# Patient Record
Sex: FEMALE | Race: WHITE
[De-identification: names, ages, dates, MRNs, and addresses within clinical notes are randomized per-mention and may not be internally consistent; named-entity substitution may affect disease eponyms.]

---

## 2017-02-04 ENCOUNTER — HOSPITAL ENCOUNTER (INPATIENT)
Dept: HOSPITAL 10 - OBT | Age: 22
LOS: 3 days | Discharge: HOME | End: 2017-02-07
Attending: OBSTETRICS & GYNECOLOGY | Admitting: OBSTETRICS & GYNECOLOGY
Payer: COMMERCIAL

## 2017-02-04 VITALS
BODY MASS INDEX: 40.62 KG/M2 | HEIGHT: 63 IN | HEIGHT: 63 IN | BODY MASS INDEX: 40.62 KG/M2 | WEIGHT: 229.28 LBS | WEIGHT: 229.28 LBS

## 2017-02-04 VITALS — SYSTOLIC BLOOD PRESSURE: 151 MMHG | HEART RATE: 86 BPM | RESPIRATION RATE: 18 BRPM | DIASTOLIC BLOOD PRESSURE: 81 MMHG

## 2017-02-04 DIAGNOSIS — Z3A.40: ICD-10-CM

## 2017-02-04 DIAGNOSIS — O48.0: Primary | ICD-10-CM

## 2017-02-04 LAB
ADD UMIC: YES
ALBUMIN SERPL-MCNC: 3.3 G/DL (ref 3.3–4.9)
ALBUMIN/GLOB SERPL: 0.97 {RATIO}
ALP SERPL-CCNC: 205 IU/L (ref 42–121)
ALT SERPL-CCNC: 21 IU/L (ref 13–69)
ANION GAP SERPL CALC-SCNC: 14 MMOL/L (ref 8–16)
APTT BLD: 27.5 SEC (ref 25–35)
AST SERPL-CCNC: 15 IU/L (ref 15–46)
BACTERIA #/AREA URNS HPF: (no result) /[HPF]
BASOPHILS # BLD AUTO: 0 10^3/UL (ref 0–0.1)
BASOPHILS NFR BLD: 0.4 % (ref 0–2)
BILIRUB DIRECT SERPL-MCNC: 0 MG/DL (ref 0–0.2)
BILIRUB SERPL-MCNC: 0.3 MG/DL (ref 0.2–1.3)
BUN SERPL-MCNC: 6 MG/DL (ref 7–20)
CALCIUM SERPL-MCNC: 9.1 MG/DL (ref 8.4–10.2)
CHLORIDE SERPL-SCNC: 105 MMOL/L (ref 97–110)
CO2 SERPL-SCNC: 24 MMOL/L (ref 21–31)
COLOR UR: (no result)
CONDITION: 1
CREAT SERPL-MCNC: 0.46 MG/DL (ref 0.44–1)
EOSINOPHIL # BLD: 0 10^3/UL (ref 0–0.5)
EOSINOPHIL NFR BLD: 0.3 % (ref 0–7)
ERYTHROCYTE [DISTWIDTH] IN BLOOD BY AUTOMATED COUNT: 13.5 % (ref 11.5–14.5)
FIBRINOGEN PPP-MCNC: 449 MG/DL (ref 207–461)
GLOBULIN SER-MCNC: 3.4 G/DL (ref 1.3–3.2)
GLUCOSE SERPL-MCNC: 69 MG/DL (ref 70–220)
GLUCOSE UR STRIP-MCNC: NEGATIVE %
HCT VFR BLD CALC: 34.5 % (ref 37–47)
HGB BLD-MCNC: 11.5 G/DL (ref 12–16)
ICTOTEST: NEGATIVE
INR PPP: 0.88
KETONES UR STRIP.AUTO-MCNC: (no result) MG/DL
LYMPHOCYTES # BLD AUTO: 2.1 10^3/UL (ref 0.8–2.9)
LYMPHOCYTES NFR BLD AUTO: 23.5 % (ref 15–51)
MCH RBC QN AUTO: 29.4 PG (ref 29–33)
MCHC RBC AUTO-ENTMCNC: 33.3 G/DL (ref 32–37)
MCV RBC AUTO: 88.4 FL (ref 82–101)
MONOCYTES # BLD: 0.7 10^3/UL (ref 0.3–0.9)
MONOCYTES NFR BLD: 7.3 % (ref 0–11)
NEUTROPHILS # BLD: 6.3 10^3/UL (ref 1.6–7.5)
NEUTROPHILS NFR BLD AUTO: 68.5 % (ref 39–77)
NITRITE UR QL STRIP.AUTO: NEGATIVE
NRBC # BLD MANUAL: 0 10^3/UL (ref 0–0)
NRBC BLD QL: 0 /100WBC (ref 0–0)
PLATELET # BLD: 214 10^3/UL (ref 140–440)
PMV BLD AUTO: 11.1 FL (ref 7.4–10.4)
POTASSIUM SERPL-SCNC: 4.2 MMOL/L (ref 3.5–5.1)
PROT SERPL-MCNC: 6.7 G/DL (ref 6.1–8.1)
PROTHROMBIN TIME: 11.9 SEC (ref 12.2–14.2)
PT RATIO: 0.9
RBC # BLD AUTO: 3.91 10^6/UL (ref 4.2–5.4)
RBC # UR AUTO: (no result) /UL
RBC #/AREA URNS HPF: (no result) /HPF
SODIUM SERPL-SCNC: 139 MMOL/L (ref 135–144)
SQUAMOUS #/AREA URNS HPF: (no result) /[HPF]
URATE SERPL-MCNC: 4.5 MG/DL (ref 3.1–7.9)
URINE BILIRUBIN (DIP): (no result)
URINE TOTAL PROTEIN (DIP): (no result)
UROBILINOGEN UR STRIP-ACNC: (no result) (ref 0.1–1)
WBC # BLD AUTO: 9.1 10^3/UL (ref 4.8–10.8)
WBC # BLD: 9.1 10^3/UL (ref 4.8–10.8)
WBC # UR STRIP: (no result) /UL

## 2017-02-04 PROCEDURE — 87340 HEPATITIS B SURFACE AG IA: CPT

## 2017-02-04 PROCEDURE — 85025 COMPLETE CBC W/AUTO DIFF WBC: CPT

## 2017-02-04 PROCEDURE — 85384 FIBRINOGEN ACTIVITY: CPT

## 2017-02-04 PROCEDURE — 86703 HIV-1/HIV-2 1 RESULT ANTBDY: CPT

## 2017-02-04 PROCEDURE — 85730 THROMBOPLASTIN TIME PARTIAL: CPT

## 2017-02-04 PROCEDURE — 76818 FETAL BIOPHYS PROFILE W/NST: CPT

## 2017-02-04 PROCEDURE — 85610 PROTHROMBIN TIME: CPT

## 2017-02-04 PROCEDURE — 36415 COLL VENOUS BLD VENIPUNCTURE: CPT

## 2017-02-04 PROCEDURE — 80053 COMPREHEN METABOLIC PANEL: CPT

## 2017-02-04 PROCEDURE — 86592 SYPHILIS TEST NON-TREP QUAL: CPT

## 2017-02-04 PROCEDURE — 80307 DRUG TEST PRSMV CHEM ANLYZR: CPT

## 2017-02-04 PROCEDURE — 86762 RUBELLA ANTIBODY: CPT

## 2017-02-04 PROCEDURE — 81001 URINALYSIS AUTO W/SCOPE: CPT

## 2017-02-04 PROCEDURE — G0463 HOSPITAL OUTPT CLINIC VISIT: HCPCS

## 2017-02-04 PROCEDURE — 86900 BLOOD TYPING SEROLOGIC ABO: CPT

## 2017-02-04 PROCEDURE — 90715 TDAP VACCINE 7 YRS/> IM: CPT

## 2017-02-04 PROCEDURE — 76815 OB US LIMITED FETUS(S): CPT

## 2017-02-04 PROCEDURE — 62319: CPT

## 2017-02-04 PROCEDURE — 86901 BLOOD TYPING SEROLOGIC RH(D): CPT

## 2017-02-04 PROCEDURE — 81003 URINALYSIS AUTO W/O SCOPE: CPT

## 2017-02-04 PROCEDURE — 84560 ASSAY OF URINE/URIC ACID: CPT

## 2017-02-04 RX ADMIN — PYRIDOXINE HYDROCHLORIDE SCH MLS/HR: 100 INJECTION, SOLUTION INTRAMUSCULAR; INTRAVENOUS at 21:17

## 2017-02-04 NOTE — RADRPT
PROCEDURE:   US OB 

 

CLINICAL INDICATION:   POST DATES   VAG BLEEDING

 

TECHNIQUE:   Multiple sonographic images of the pelvis were obtained.  The images were reviewed on a
 PACS workstation. 

 

COMPARISON:   None

 

FINDINGS:

The cervix is not well visualized.

There is a single viable intrauterine gestation.  

Cardiac activity is present with 148 beats per minute. 

There is a vertex presentation. 

 

The placenta is fundal. There is no evidence for an abruption or placenta previa.

There is a subjectively normal amount of amniotic fluid.

 

Measurements were made in order to determine fetal age. The results are as follows (cm):

BPD =8.85

HC   =31.43

AC   =34.25

FL    =7.45

Estimated gestational age by ultrasound of approximately 36 weeks, 6 days.  

The estimated date of delivery by ultrasound is 02/26/2017.  

Reported gestational age by LMP of approximately 40 weeks, 5 days.

The reported date of delivery by LMP was 01/30/2017.

 

EFW = 3230  grams (14th percentile)

 

 

IMPRESSION:

Single viable intrauterine gestation of approximately 36 weeks, 6 days .  

The estimated date of delivery is 02/26/2017 .

Dating by ultrasound is within 3 weeks and 5 days of dating by LMP.

 

Estimated fetal weight in the 14th percentile.

 

Cephalic presentation.

 

 

RPTAT: EE

_____________________________________________ 

Physician Yolande           Date    Time 

Electronically viewed and signed by Physician Yolande on 02/04/2017 19:27 

 

D:  02/04/2017 19:27  T:  02/04/2017 19:27

RICHA/

## 2017-02-05 VITALS — DIASTOLIC BLOOD PRESSURE: 75 MMHG | SYSTOLIC BLOOD PRESSURE: 146 MMHG | RESPIRATION RATE: 18 BRPM | HEART RATE: 86 BPM

## 2017-02-05 VITALS — HEART RATE: 79 BPM | SYSTOLIC BLOOD PRESSURE: 129 MMHG | RESPIRATION RATE: 18 BRPM | DIASTOLIC BLOOD PRESSURE: 62 MMHG

## 2017-02-05 VITALS — HEART RATE: 94 BPM | DIASTOLIC BLOOD PRESSURE: 68 MMHG | SYSTOLIC BLOOD PRESSURE: 133 MMHG | RESPIRATION RATE: 19 BRPM

## 2017-02-05 VITALS — SYSTOLIC BLOOD PRESSURE: 124 MMHG | RESPIRATION RATE: 18 BRPM | DIASTOLIC BLOOD PRESSURE: 67 MMHG | HEART RATE: 82 BPM

## 2017-02-05 VITALS — HEART RATE: 82 BPM | DIASTOLIC BLOOD PRESSURE: 64 MMHG | SYSTOLIC BLOOD PRESSURE: 131 MMHG | RESPIRATION RATE: 18 BRPM

## 2017-02-05 VITALS — DIASTOLIC BLOOD PRESSURE: 61 MMHG | RESPIRATION RATE: 20 BRPM | SYSTOLIC BLOOD PRESSURE: 126 MMHG

## 2017-02-05 VITALS — SYSTOLIC BLOOD PRESSURE: 131 MMHG | HEART RATE: 95 BPM | RESPIRATION RATE: 18 BRPM | DIASTOLIC BLOOD PRESSURE: 74 MMHG

## 2017-02-05 PROCEDURE — 0KQM0ZZ REPAIR PERINEUM MUSCLE, OPEN APPROACH: ICD-10-PCS | Performed by: OBSTETRICS & GYNECOLOGY

## 2017-02-05 RX ADMIN — IBUPROFEN SCH MG: 600 TABLET ORAL at 23:47

## 2017-02-05 RX ADMIN — AMPICILLIN SODIUM SCH MLS/HR: 1 INJECTION, POWDER, FOR SOLUTION INTRAMUSCULAR; INTRAVENOUS at 16:58

## 2017-02-05 RX ADMIN — PYRIDOXINE HYDROCHLORIDE PRN MLS/HR: 100 INJECTION, SOLUTION INTRAMUSCULAR; INTRAVENOUS at 18:22

## 2017-02-05 RX ADMIN — AMPICILLIN SODIUM SCH MLS/HR: 1 INJECTION, POWDER, FOR SOLUTION INTRAMUSCULAR; INTRAVENOUS at 05:25

## 2017-02-05 RX ADMIN — AMPICILLIN SODIUM SCH MLS/HR: 1 INJECTION, POWDER, FOR SOLUTION INTRAMUSCULAR; INTRAVENOUS at 09:03

## 2017-02-05 RX ADMIN — AMPICILLIN SODIUM SCH MLS/HR: 1 INJECTION, POWDER, FOR SOLUTION INTRAMUSCULAR; INTRAVENOUS at 13:34

## 2017-02-05 RX ADMIN — PYRIDOXINE HYDROCHLORIDE PRN MLS/HR: 100 INJECTION, SOLUTION INTRAMUSCULAR; INTRAVENOUS at 09:05

## 2017-02-05 RX ADMIN — PYRIDOXINE HYDROCHLORIDE PRN MLS/HR: 100 INJECTION, SOLUTION INTRAMUSCULAR; INTRAVENOUS at 10:49

## 2017-02-05 RX ADMIN — PYRIDOXINE HYDROCHLORIDE SCH MLS/HR: 100 INJECTION, SOLUTION INTRAMUSCULAR; INTRAVENOUS at 05:25

## 2017-02-05 RX ADMIN — AMPICILLIN SODIUM SCH MLS/HR: 1 INJECTION, POWDER, FOR SOLUTION INTRAMUSCULAR; INTRAVENOUS at 01:05

## 2017-02-05 NOTE — HP
Date/Time of Note


Date/Time of Note


DATE: 17 


TIME: 10:46





OB - History


Hx of Present Pregnancy


Free Text/Dictation


@40+wks GA in labor High Blood Pressure 150/80


:  1


Para:  0


Prenatal Care:  Good Care


Ultrasounds:  Normal mid trimester US


Obstetrical Complications:  None


Medical Complications:  None





Past Family/Social History


*


Past Medical, Surgical, Family and Obstetric Histories reviewed from prenatal 

chart.





OB  Admission Exam


Vital Signs


Vital Signs





 Vital Signs








  Date Time  Temp Pulse Resp B/P Pulse Ox O2 Delivery O2 Flow Rate FiO2


 


17 18:50 97.9 86 18 151/81  Room Air  











Physical Exam


Abdomen:  WNL


Cervical Dilatation:  2cm


Effacement:  50%


Station:  -1


Fetal Heart Rate:  140's


Accelerations:  Accelerations Present


Decelerations:  No Decelerations


Varibility:  Moderate


Contractions on Admission:  6-10 Minutes Apart


Last 72 hours Lab Results


 CBC & BMP


17 19:10











 Liver Function








Test


  17


19:10


 


Alanine Aminotransferase


(ALT/SGPT) 21  


 


 


Albumin 3.3  


 


Alkaline Phosphatase 205  H


 


Aspartate Amino Transf


(AST/SGOT) 15  


 


 


Direct Bilirubin 0.00  


 


Total Protein 6.7  











OB  Assessment/Plan


Reason for admission:  induction of labor


Plan:  Induction











PURVI HERNANDEZ M.D. 2017 10:47

## 2017-02-05 NOTE — LDN
Date/Time of Note


Date/Time of Note


DATE: 2/5/17 


TIME: 19:21





Delivery Summary


Placenta Delivered:  Spontaneously


Meconium:  Light


Perineum intact?:  No


Perineal laceration:  2


Perineal laceration repair:  


yes


Anesthesia type:  Epidural


Sponge & Needle done & correct:  Yes


All needle counts correct:  Yes


Any foreign bodies felt in the:  No


Problems:  





Infant Delivery Information


Sex


Infant Sex:  female





Apgars


1 Minute:  7


5 Minute:  9





Suctioning


Nose & mouth suctioned at ju:  Yes


Delee suction performed:  Yes





Umbilical Cord


Umbilical cord with:  3 Vessels


Cord presentations:  nuchal cord


Nuchal cord present X:  1


Cord Blood was obtained:  Yes





Mother & Baby Disposition


Disposition


Mom & Baby to Maternity; Good:  Yes


Baby to NICU:  No











PURVI HERNANDEZ M.D. Feb 5, 2017 19:23

## 2017-02-06 VITALS — RESPIRATION RATE: 18 BRPM | HEART RATE: 80 BPM | DIASTOLIC BLOOD PRESSURE: 67 MMHG | SYSTOLIC BLOOD PRESSURE: 110 MMHG

## 2017-02-06 VITALS — HEART RATE: 86 BPM | RESPIRATION RATE: 18 BRPM | SYSTOLIC BLOOD PRESSURE: 119 MMHG | DIASTOLIC BLOOD PRESSURE: 66 MMHG

## 2017-02-06 VITALS — HEART RATE: 89 BPM | RESPIRATION RATE: 18 BRPM | DIASTOLIC BLOOD PRESSURE: 71 MMHG | SYSTOLIC BLOOD PRESSURE: 117 MMHG

## 2017-02-06 VITALS — DIASTOLIC BLOOD PRESSURE: 69 MMHG | RESPIRATION RATE: 18 BRPM | SYSTOLIC BLOOD PRESSURE: 122 MMHG | HEART RATE: 93 BPM

## 2017-02-06 VITALS — SYSTOLIC BLOOD PRESSURE: 113 MMHG | RESPIRATION RATE: 18 BRPM | HEART RATE: 86 BPM | DIASTOLIC BLOOD PRESSURE: 58 MMHG

## 2017-02-06 VITALS — HEART RATE: 100 BPM | RESPIRATION RATE: 20 BRPM | DIASTOLIC BLOOD PRESSURE: 72 MMHG | SYSTOLIC BLOOD PRESSURE: 129 MMHG

## 2017-02-06 LAB
BASOPHILS # BLD AUTO: 0 10^3/UL (ref 0–0.1)
BASOPHILS NFR BLD: 0.3 % (ref 0–2)
CONDITION: 1
EOSINOPHIL # BLD: 0 10^3/UL (ref 0–0.5)
EOSINOPHIL NFR BLD: 0 % (ref 0–7)
ERYTHROCYTE [DISTWIDTH] IN BLOOD BY AUTOMATED COUNT: 13.4 % (ref 11.5–14.5)
HCT VFR BLD CALC: 27.9 % (ref 37–47)
HGB BLD-MCNC: 9.6 G/DL (ref 12–16)
LYMPHOCYTES # BLD AUTO: 2.7 10^3/UL (ref 0.8–2.9)
LYMPHOCYTES NFR BLD AUTO: 15.6 % (ref 15–51)
MCH RBC QN AUTO: 30.4 PG (ref 29–33)
MCHC RBC AUTO-ENTMCNC: 34.4 G/DL (ref 32–37)
MCV RBC AUTO: 88.4 FL (ref 82–101)
MONOCYTES # BLD: 1.4 10^3/UL (ref 0.3–0.9)
MONOCYTES NFR BLD: 8.3 % (ref 0–11)
NEUTROPHILS # BLD: 13 10^3/UL (ref 1.6–7.5)
NEUTROPHILS NFR BLD AUTO: 75.8 % (ref 39–77)
NRBC # BLD MANUAL: 0 10^3/UL (ref 0–0)
NRBC BLD QL: 0 /100WBC (ref 0–0)
PLATELET # BLD: 172 10^3/UL (ref 140–440)
PMV BLD AUTO: 11.4 FL (ref 7.4–10.4)
RBC # BLD AUTO: 3.16 10^6/UL (ref 4.2–5.4)
WBC # BLD AUTO: 17.1 10^3/UL (ref 4.8–10.8)
WBC # BLD: 17.1 10^3/UL (ref 4.8–10.8)

## 2017-02-06 RX ADMIN — IBUPROFEN SCH MG: 600 TABLET ORAL at 12:00

## 2017-02-06 RX ADMIN — DOCUSATE SODIUM AND SENNOSIDES SCH TAB: 8.6; 5 TABLET, FILM COATED ORAL at 21:00

## 2017-02-06 RX ADMIN — DOCUSATE SODIUM AND SENNOSIDES SCH TAB: 8.6; 5 TABLET, FILM COATED ORAL at 09:27

## 2017-02-06 RX ADMIN — PYRIDOXINE HYDROCHLORIDE SCH MLS/HR: 100 INJECTION, SOLUTION INTRAMUSCULAR; INTRAVENOUS at 13:53

## 2017-02-06 RX ADMIN — IBUPROFEN SCH MG: 600 TABLET ORAL at 05:39

## 2017-02-06 RX ADMIN — IBUPROFEN SCH MG: 600 TABLET ORAL at 18:00

## 2017-02-06 RX ADMIN — PYRIDOXINE HYDROCHLORIDE SCH MLS/HR: 100 INJECTION, SOLUTION INTRAMUSCULAR; INTRAVENOUS at 21:53

## 2017-02-06 RX ADMIN — PYRIDOXINE HYDROCHLORIDE SCH MLS/HR: 100 INJECTION, SOLUTION INTRAMUSCULAR; INTRAVENOUS at 05:02

## 2017-02-06 NOTE — PN
Date/Time of Note


Date/Time of Note


DATE: 2/6/17 


TIME: 19:09





OB Subjective


Subjective


Subjective


PPD#1 is stable afebrile tolerates diet No VB +BM +voids No sign of depressiom


VS stable


Gen NAD


Abd soft NT ND


Genitalia No blood at perinium 


--->discharge plan tomorrow











PURVI HERNANDEZ M.D. Feb 6, 2017 19:10

## 2017-02-06 NOTE — RADRPT
PROCEDURE:   OB ultrasound for biophysical profile 

 

CLINICAL INDICATION:   Post dates, vaginal bleeding.  Biophysical profile.  Pregnancy. 

 

TECHNIQUE:   Multiple sonographic images of the pelvis were obtained.  Transabdominal view of the gr
avid uterus are available for review.  The images were reviewed on a PACS workstation.  

 

COMPARISON:   None 

 

FINDINGS:

 

Fetal breathing movement = 2/2

Fetal tone = 2/2

Fetal motion = 2/2

SHAKA = 2/2

 

Single intrauterine gestation is identified in cephalic position. Fetal heart rate is 146 bpm.  Plac
enta is fundal without evidence for abruption or previa.  SHAKA measures 7.5 cm, within normal limits.

 

IMPRESSION:

 

1.  Single live intrauterine gestation.  

2.  Biophysical profile = 8/8.

3.  SHAKA = 7.5 cm. 

 

RPTAT: QQ

_____________________________________________ 

.Kiran Villanueva MD, MD           Date    Time 

Electronically viewed and signed by .Kiran Villanueva MD, MD on 02/06/2017 10:03 

 

D:  02/06/2017 10:03  T:  02/06/2017 10:03

.R/

## 2017-02-07 VITALS — HEART RATE: 78 BPM | DIASTOLIC BLOOD PRESSURE: 67 MMHG | SYSTOLIC BLOOD PRESSURE: 114 MMHG | RESPIRATION RATE: 16 BRPM

## 2017-02-07 VITALS — RESPIRATION RATE: 18 BRPM | HEART RATE: 80 BPM | SYSTOLIC BLOOD PRESSURE: 124 MMHG | DIASTOLIC BLOOD PRESSURE: 74 MMHG

## 2017-02-07 LAB — RUBV IGG SERPL IA-ACNC: 3.45

## 2017-02-07 RX ADMIN — IBUPROFEN SCH MG: 600 TABLET ORAL at 05:52

## 2017-02-07 RX ADMIN — IBUPROFEN SCH MG: 600 TABLET ORAL at 00:00

## 2017-02-07 RX ADMIN — DOCUSATE SODIUM AND SENNOSIDES SCH TAB: 8.6; 5 TABLET, FILM COATED ORAL at 08:48

## 2018-01-04 ENCOUNTER — HOSPITAL ENCOUNTER (EMERGENCY)
Dept: HOSPITAL 91 - E/R | Age: 23
Discharge: HOME | End: 2018-01-04
Payer: COMMERCIAL

## 2018-01-04 ENCOUNTER — HOSPITAL ENCOUNTER (EMERGENCY)
Age: 23
Discharge: HOME | End: 2018-01-04

## 2018-01-04 DIAGNOSIS — R19.7: ICD-10-CM

## 2018-01-04 DIAGNOSIS — R11.2: Primary | ICD-10-CM

## 2018-01-04 PROCEDURE — 99284 EMERGENCY DEPT VISIT MOD MDM: CPT

## 2018-06-15 ENCOUNTER — HOSPITAL ENCOUNTER (EMERGENCY)
Dept: HOSPITAL 91 - FTE | Age: 23
Discharge: HOME | End: 2018-06-15
Payer: COMMERCIAL

## 2018-06-15 ENCOUNTER — HOSPITAL ENCOUNTER (EMERGENCY)
Age: 23
Discharge: HOME | End: 2018-06-15

## 2018-06-15 DIAGNOSIS — J02.9: Primary | ICD-10-CM

## 2018-06-15 PROCEDURE — 99284 EMERGENCY DEPT VISIT MOD MDM: CPT

## 2018-06-15 PROCEDURE — 96372 THER/PROPH/DIAG INJ SC/IM: CPT

## 2018-06-15 PROCEDURE — 87880 STREP A ASSAY W/OPTIC: CPT

## 2018-06-15 RX ADMIN — DEXAMETHASONE SODIUM PHOSPHATE 1 MG: 10 INJECTION, SOLUTION INTRAMUSCULAR; INTRAVENOUS at 21:01

## 2018-06-15 RX ADMIN — IBUPROFEN 1 MG: 600 TABLET ORAL at 21:01

## 2019-06-10 ENCOUNTER — HOSPITAL ENCOUNTER (EMERGENCY)
Dept: HOSPITAL 91 - FTE | Age: 24
Discharge: HOME | End: 2019-06-10
Payer: COMMERCIAL

## 2019-06-10 ENCOUNTER — HOSPITAL ENCOUNTER (EMERGENCY)
Dept: HOSPITAL 10 - FTE | Age: 24
Discharge: HOME | End: 2019-06-10
Payer: COMMERCIAL

## 2019-06-10 VITALS
HEIGHT: 65 IN | WEIGHT: 260.59 LBS | BODY MASS INDEX: 43.42 KG/M2 | BODY MASS INDEX: 43.42 KG/M2 | WEIGHT: 260.59 LBS | HEIGHT: 65 IN

## 2019-06-10 VITALS — DIASTOLIC BLOOD PRESSURE: 71 MMHG | SYSTOLIC BLOOD PRESSURE: 129 MMHG | HEART RATE: 90 BPM | RESPIRATION RATE: 18 BRPM

## 2019-06-10 DIAGNOSIS — H61.21: ICD-10-CM

## 2019-06-10 DIAGNOSIS — J02.9: Primary | ICD-10-CM

## 2019-06-10 PROCEDURE — 99283 EMERGENCY DEPT VISIT LOW MDM: CPT

## 2019-06-10 NOTE — ERD
ER Documentation


Chief Complaint


Chief Complaint





sorethroat, ear pain x3 days





HPI


23-year-old woman tactile fever and sore throat x3 days as well as right 


earache.  She states her temperature went up to 100 F, she denies difficulty 


speaking or swallowing, no cough or congestion, no headache or neck stiffness, 


no trauma, no chest pain or shortness of breath.





ROS


All systems reviewed and are negative except as per history of present illness.





Medications


Home Meds


Active Scripts


Azithromycin* (Zithromax*) 250 Mg Tablet, 250 MG PO .ZPACK AS DIRECTED, #6 TAB


   TAKE 500 MG (2 TABS) THE FIRST DAY THEN 250 MG (1 TAB) DAYS 2-5


   Prov:SIMEON LUCAS MD         6/10/19


Ibuprofen* (Motrin*) 600 Mg Tab, 600 MG PO Q8 PRN for PAIN AND/OR INFLAMMATION, 


#30 TAB


   Prov:SIMEON LUCAS MD         6/10/19


Pseudoephedrine Hcl* (Sudogest*) 60 Mg Tablet, 60 MG PO Q6 PRN for CONGESTION 


for 7 Days, #20 TAB


   Prov:LORENA,ANEUDY         6/15/18


Phenol* (Chloraseptic* Spray) 177 Ml [place].pump, 5 SPRAY MT Q2H PRN for SORE 


THROAT for 5 Days, #177 ML


   Prov:LORENA,ANEUDY         6/15/18


Ibuprofen* (Motrin*) 600 Mg Tab, 600 MG PO Q6, #30 TAB


   Prov:LORENA,ANEUDY         6/15/18


Famotidine* (Pepcid*) 20 Mg Tablet, 20 MG PO BID for 4 Days, TAB


   Prov:ANASTACIO SANZ         1/4/18


Loperamide Hcl* (Imodium*) 2 Mg Capsule, 2 MG PO .AFTER EA LOOSE BM PRN for 


DIARRHEA, #20 TAB


   Prov:YVON,ANASTACIO C         1/4/18


Dicyclomine HCl (Dicyclomine HCl) 10 Mg Capsule, 10 MG PO QID, #20 CAP


   Prov:YVONANASTACIO BAUER C         1/4/18


Ondansetron Hcl* (Zofran*) 4 Mg Tab, 4 MG PO Q4H PRN for NAUSEA AND OR VOMITING,


#15 TAB


   Prov:ANASTACIO SANZ         1/4/18





Allergies


Allergies:  


Coded Allergies:  


     No Known Allergy (Unverified , 2/4/17)





PMhx/Soc


Medical and Surgical Hx:  pt denies Medical Hx, pt denies Surgical Hx


Hx Alcohol Use:  No


Hx Substance Use:  No


Hx Tobacco Use:  No





FmHx


Family History:  No diabetes





Physical Exam


Vitals





Vital Signs


  Date      Temp  Pulse  Resp  B/P (MAP)   Pulse Ox  O2          O2 Flow    FiO2


Time                                                 Delivery    Rate


   6/10/19  99.3     90    18      129/71        97


     15:57                           (90)





Physical Exam


GENERAL: Well-developed, well-nourished, well-hydrated, in no apparent distress,


looks nontoxic in appearance


HEENT: Cerumen impaction right EAC, mild pharyngeal erythema with enlarged 


tonsils, uvula midline, no submandibular induration


SKIN: Warm and dry to touch, no abrasions, contusions, or hematomas, no 


lacerations, no ecchymosis, no target lesions, and without ulcers


EXTREMITIES: No clubbing cyanosis or edema, calves are bilaterally symmetrical, 


no Homans sign, no popliteal cord sign. Distal pulses equal and bilateral


PSYCH: Normal affect without agitation or irritability





Procedures/MDM


Patient looks well, I recommended OTC ceruminolytic and oral antibiotics that I 


prescribed.





Patient feels much better at this time, and vital signs are normal, symptoms 


have improved. I did give strict instructions to return to the ED if symptoms 


continue or worsen, patient will otherwise follow-up with primary care 


physician. Patient understood instructions and agreed to plan.





Disclaimer: Inadvertent spelling and grammatical errors are likely due to 


EHR/dictation software use and do not reflect on the overall quality of patient 


care. Also, please note that the electronic time recorded on this note does not 


necessarily reflect the actual time of the patient encounter.





Departure


Diagnosis:  


   Primary Impression:  


   Acute pharyngitis


   Pharyngitis/tonsillitis etiology:  unspecified etiology  Qualified Codes:  


   J02.9 - Acute pharyngitis, unspecified


   Additional Impression:  


   Cerumen impaction


   Laterality:  right  Qualified Codes:  H61.21 - Impacted cerumen, right ear


Condition:  Good


Patient Instructions:  Pharyngitis, Strep (Presumed)











SIMEON LUCAS MD             Vivek 10, 2019 17:18